# Patient Record
Sex: FEMALE
[De-identification: names, ages, dates, MRNs, and addresses within clinical notes are randomized per-mention and may not be internally consistent; named-entity substitution may affect disease eponyms.]

---

## 2019-06-14 ENCOUNTER — HOSPITAL ENCOUNTER (OUTPATIENT)
Dept: HOSPITAL 5 - TRG | Age: 24
Discharge: HOME | End: 2019-06-14
Attending: OBSTETRICS & GYNECOLOGY
Payer: MEDICAID

## 2019-06-14 VITALS — SYSTOLIC BLOOD PRESSURE: 107 MMHG | DIASTOLIC BLOOD PRESSURE: 73 MMHG

## 2019-06-14 DIAGNOSIS — O99.513: ICD-10-CM

## 2019-06-14 DIAGNOSIS — F17.200: ICD-10-CM

## 2019-06-14 DIAGNOSIS — O99.333: ICD-10-CM

## 2019-06-14 DIAGNOSIS — Z3A.29: ICD-10-CM

## 2019-06-14 DIAGNOSIS — O99.343: ICD-10-CM

## 2019-06-14 DIAGNOSIS — O24.913: ICD-10-CM

## 2019-06-14 DIAGNOSIS — F31.9: ICD-10-CM

## 2019-06-14 LAB
BACTERIA #/AREA URNS HPF: (no result) /HPF
BILIRUB UR QL STRIP: (no result)
BLOOD UR QL VISUAL: (no result)
PH UR STRIP: 8 [PH] (ref 5–7)
PROT UR STRIP-MCNC: (no result) MG/DL
RBC #/AREA URNS HPF: < 1 /HPF (ref 0–6)
UROBILINOGEN UR-MCNC: < 2 MG/DL (ref ?–2)
WBC #/AREA URNS HPF: < 1 /HPF (ref 0–6)

## 2019-06-14 PROCEDURE — 59025 FETAL NON-STRESS TEST: CPT

## 2019-06-14 PROCEDURE — 96360 HYDRATION IV INFUSION INIT: CPT

## 2019-06-14 PROCEDURE — 81001 URINALYSIS AUTO W/SCOPE: CPT

## 2019-07-04 ENCOUNTER — HOSPITAL ENCOUNTER (INPATIENT)
Dept: HOSPITAL 5 - TRG | Age: 24
LOS: 4 days | Discharge: HOME | DRG: 781 | End: 2019-07-08
Attending: OBSTETRICS & GYNECOLOGY | Admitting: OBSTETRICS & GYNECOLOGY
Payer: COMMERCIAL

## 2019-07-04 DIAGNOSIS — O24.013: ICD-10-CM

## 2019-07-04 DIAGNOSIS — Z88.2: ICD-10-CM

## 2019-07-04 DIAGNOSIS — Z91.018: ICD-10-CM

## 2019-07-04 DIAGNOSIS — Z90.10: ICD-10-CM

## 2019-07-04 DIAGNOSIS — Z95.1: ICD-10-CM

## 2019-07-04 DIAGNOSIS — Z87.890: ICD-10-CM

## 2019-07-04 DIAGNOSIS — O99.343: ICD-10-CM

## 2019-07-04 DIAGNOSIS — Z91.013: ICD-10-CM

## 2019-07-04 DIAGNOSIS — O99.513: ICD-10-CM

## 2019-07-04 DIAGNOSIS — O60.03: ICD-10-CM

## 2019-07-04 DIAGNOSIS — E10.9: ICD-10-CM

## 2019-07-04 DIAGNOSIS — Z90.49: ICD-10-CM

## 2019-07-04 DIAGNOSIS — F25.9: ICD-10-CM

## 2019-07-04 DIAGNOSIS — Z90.13: ICD-10-CM

## 2019-07-04 DIAGNOSIS — Z3A.32: ICD-10-CM

## 2019-07-04 DIAGNOSIS — J45.909: ICD-10-CM

## 2019-07-04 DIAGNOSIS — Z79.4: ICD-10-CM

## 2019-07-04 PROCEDURE — 86850 RBC ANTIBODY SCREEN: CPT

## 2019-07-04 PROCEDURE — G0378 HOSPITAL OBSERVATION PER HR: HCPCS

## 2019-07-04 PROCEDURE — 93306 TTE W/DOPPLER COMPLETE: CPT

## 2019-07-04 PROCEDURE — 82962 GLUCOSE BLOOD TEST: CPT

## 2019-07-04 PROCEDURE — 87086 URINE CULTURE/COLONY COUNT: CPT

## 2019-07-04 PROCEDURE — 76816 OB US FOLLOW-UP PER FETUS: CPT

## 2019-07-04 PROCEDURE — 76815 OB US LIMITED FETUS(S): CPT

## 2019-07-04 PROCEDURE — 94640 AIRWAY INHALATION TREATMENT: CPT

## 2019-07-04 PROCEDURE — 86870 RBC ANTIBODY IDENTIFICATION: CPT

## 2019-07-04 PROCEDURE — 76819 FETAL BIOPHYS PROFIL W/O NST: CPT

## 2019-07-04 PROCEDURE — 86901 BLOOD TYPING SEROLOGIC RH(D): CPT

## 2019-07-04 PROCEDURE — 99406 BEHAV CHNG SMOKING 3-10 MIN: CPT

## 2019-07-04 PROCEDURE — 87116 MYCOBACTERIA CULTURE: CPT

## 2019-07-04 PROCEDURE — 85025 COMPLETE CBC W/AUTO DIFF WBC: CPT

## 2019-07-04 PROCEDURE — 86900 BLOOD TYPING SEROLOGIC ABO: CPT

## 2019-07-04 PROCEDURE — 36415 COLL VENOUS BLD VENIPUNCTURE: CPT

## 2019-07-04 NOTE — HISTORY AND PHYSICAL REPORT
History of Present Illness


Date of examination: 19


Date of admission: 


2019


Chief complaint: 





my water broke


History of present illness: 





Pt c/o SROM of clear fluid at 2030pm, on 19. No contractions. Pt pregnancy

complicated by  labor for which he was treated with BMZ as well as DM for

which he in on insulin. Pt confirmed SROM by triage nurse.


EDC Confirmation:  2019


Gestational Age:  14 4/7 weeks





Past Pregnancy History 


   :      5


   Term Births:      0


   Premature Births:   1


   Living Children:   1


   Para:         1


   Mult. Births:      0


   Prev :   0


   Prev.  attempt?   0


   Aborta:      3


   Elect. Ab:      0


   Spont. Ab:      3


   Ectopics:      0





Pregnancy # 1


   Delivery date:     10/09/2016


   Weeks Gestation:   36


   Delivery type:     Vaginal


   Anesthesia type:     epidural


   Delivery location:     [facility]


   Infant Sex:      male


   Birth weight:      7.69


   Name:      Russel


   Comments:       labor





Pregnancy # 2


   Delivery date:     2018


   Weeks Gestation:   early


   Delivery type:     SAB








Past Medical History:


   Reviewed history from 2016 and no changes required:


      IDDM


      Asthma - albuterol


      Schitzoaffective dx - on lithium


      hx abnormal pap





Past Surgical History:


   appendectomy 2012


   right lobe of thyroid removed 


   elective masectomy 2018





Past Medical History 


Surgery (Non-gyn): appendectomy 2012


right lobe of thyroid removed 


elective masectomy 2018





Abnormal PAP: positive





Social Hx: Legally seperated


no ETOH/Drugs/smoking


FOC not involved 





Infection History 


Hx of STD: none


HIV Risk Eval: low risk


Hepatitis B Risk Eval: low risk


Rash, Viral, or Febrile illness since last LMP? no





Genetic History 


 Congenital Heart Defect:


    Mom: no  Dad: no


Canavan Disease:


    Mom: no  Dad: no


Thalassemia


    Mom: no  Dad: no


Neural Tube Defect


    Mom: no  Dad: no


Down's Syndrome


    Mom: no  Dad: no


Kiko-Sachs


    Mom: no  Dad: no


Sickle Cell Disease/Trait


    Mom: no  Dad: no


Hemophilia


    Mom: no  Dad: no


Muscular Dystrophy


    Mom: no  Dad: no


Cystic Fibrosis


    Mom: no  Dad: no


Brewster Chorea


    Mom: no  Dad: no


Mental Retardation


    Mom: no  Dad: no


Fragile X


    Mom: no  Dad: no


Other Genetic/Chromosomal Disorder


    Mom: no  Dad: no


Child w/other birth defect


    Mom: no  Dad: no





Enviromental Exposures 


Xray Exposure: no


Medication, drug, or alcohol use since LMP: yes


Chemical/Other Exposure: no


Exposure to Cat Liter: no


Hx of Parvovirus (Fifth Disease): no


Occupational Exposure to Children: none


Active Medications (reviewed today):


INSULIN () 


PNV () 


PROZAC 20 MG ORAL CAPSULE (FLUOXETINE HCL) 1 tab daily


IB TABS () 


FIORICET -40 MG ORAL CAPSULE (BUTALBITAL-APAP-CAFFEINE) 


ADDERALL () 


HALDOL () 





Current Allergies:


* SULFA (Critical)


* SEAFOOD (Critical)


* MUSHROOMS (Critical)





Past History


Past Medical History: other (see hpi)


Past Surgical History: other (see hpi)


GYN History: other (see hpi)


Family/Genetic History: other (see hpi)





- Obstetrical History


Expected Date of Delivery: 19


Actual Gestation: 32 Week(s) 0 Day(s) 


: 5


Para: 1


Hx # Term Pregnancies: 0


Number of  Pregnancies: 1


Number of Living Children: 1





Medications and Allergies


                                    Allergies











Allergy/AdvReac Type Severity Reaction Status Date / Time


 


acetaminophen [From Percocet] Allergy  Anaphylaxis Verified 10/08/16 22:52


 


mushroom Allergy  Anaphylaxis Verified 10/08/16 22:52


 


oxycodone HCl [From Percocet] Allergy  Anaphylaxis Verified 10/08/16 22:52


 


Sulfa (Sulfonamide Allergy  Anaphylaxis Verified 10/08/16 22:52





Antibiotics)     


 


seafood Allergy  Itching Uncoded 10/08/16 22:53











                                Home Medications











 Medication  Instructions  Recorded  Confirmed  Last Taken  Type


 


Ibuprofen [Motrin 800 MG tab] 800 mg PO TID PRN #30 tablet 10/09/16  Unknown Rx


 


Insulin Aspart [Novolog Flexpen] 0 unit SQ AC 10/09/16 10/09/16 Unknown History


 


Insulin Detemir [Levemir] 14 unit SQ BID 10/09/16 10/09/16 Unknown History


 


Lidocain2.5%/Prilocai2.5% [Emla] 5 gm TP PRN #1 tube 10/09/16  Unknown Rx


 


Prenatal Vit-Fe Fumar-FA [Prenatal 1 tab PO QDAY 10/09/16 10/09/16 10/08/16 

History





Vitamin]     


 


NIFEdipine [Nifedipine] 20 mg PO BID #60 capsule 19  Unknown Rx











Active Meds: 


Active Medications





Lactated Ringer's (Lactated Ringers)  500 mls @ 999 mls/hr IV BOLUS ONE


   Stop: 19 23:34











Review of Systems


All systems: negative





- Vital Signs


Vital signs: 


                                   Vital Signs











Temp


 


 98.4 F 


 


 19 23:09








                                        











Temp Pulse Resp BP Pulse Ox


 


 98.4 F             


 


 19 23:09            














Results


All other labs normal.








Assessment and Plan





- Patient Problems


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


Current Visit: No   Status: Acute   





(2) IDDM (insulin dependent diabetes mellitus)


Current Visit: No   Status: Acute   


Plan to address problem: 


-SSI while in house for now


-accucheck fasting and 2hr after meals








(3) Premature rupture of membranes


Current Visit: No   Status: Acute   


Plan to address problem: 


-expectant management for now


-s/p BMZ on 19 @ 28 5/7 weeks


-antibx for prolong latency


-deliver for s/sx of chorio











(4) Schizoaffective disorder


Current Visit: No   Status: Acute

## 2019-07-05 LAB
BASOPHILS # (AUTO): 0 K/MM3 (ref 0–0.1)
BASOPHILS NFR BLD AUTO: 0.1 % (ref 0–1.8)
EOSINOPHIL # BLD AUTO: 0.1 K/MM3 (ref 0–0.4)
EOSINOPHIL NFR BLD AUTO: 0.9 % (ref 0–4.3)
HCT VFR BLD CALC: 34.3 % (ref 30.3–42.9)
HGB BLD-MCNC: 11.6 GM/DL (ref 10.1–14.3)
LYMPHOCYTES # BLD AUTO: 1.5 K/MM3 (ref 1.2–5.4)
LYMPHOCYTES NFR BLD AUTO: 16.7 % (ref 13.4–35)
MCHC RBC AUTO-ENTMCNC: 34 % (ref 30–34)
MCV RBC AUTO: 85 FL (ref 79–97)
MONOCYTES # (AUTO): 0.5 K/MM3 (ref 0–0.8)
MONOCYTES % (AUTO): 5.5 % (ref 0–7.3)
PLATELET # BLD: 175 K/MM3 (ref 140–440)
RBC # BLD AUTO: 4.02 M/MM3 (ref 3.65–5.03)

## 2019-07-05 RX ADMIN — ACETAMINOPHEN SCH: 325 TABLET ORAL at 18:47

## 2019-07-05 RX ADMIN — ACETAMINOPHEN SCH MG: 325 TABLET ORAL at 19:36

## 2019-07-05 RX ADMIN — BETAMETHASONE SODIUM PHOSPHATE AND BETAMETHASONE ACETATE SCH MG: 3; 3 INJECTION, SUSPENSION INTRA-ARTICULAR; INTRALESIONAL; INTRAMUSCULAR at 10:22

## 2019-07-05 RX ADMIN — ERYTHROMYCIN LACTOBIONATE SCH MLS/HR: 500 INJECTION, POWDER, LYOPHILIZED, FOR SOLUTION INTRAVENOUS at 02:07

## 2019-07-05 RX ADMIN — AMPICILLIN SCH MLS/HR: 2 INJECTION, POWDER, FOR SOLUTION INTRAVENOUS at 21:38

## 2019-07-05 RX ADMIN — AMPICILLIN SCH MLS/HR: 2 INJECTION, POWDER, FOR SOLUTION INTRAVENOUS at 03:47

## 2019-07-05 RX ADMIN — AMPICILLIN SCH MLS/HR: 2 INJECTION, POWDER, FOR SOLUTION INTRAVENOUS at 10:24

## 2019-07-05 RX ADMIN — ERYTHROMYCIN LACTOBIONATE SCH MLS/HR: 500 INJECTION, POWDER, LYOPHILIZED, FOR SOLUTION INTRAVENOUS at 19:37

## 2019-07-05 RX ADMIN — SERTRALINE SCH MG: 50 TABLET, FILM COATED ORAL at 10:24

## 2019-07-05 RX ADMIN — ACETAMINOPHEN SCH MG: 325 TABLET ORAL at 23:30

## 2019-07-05 RX ADMIN — SODIUM CHLORIDE, SODIUM LACTATE, POTASSIUM CHLORIDE, AND CALCIUM CHLORIDE SCH MLS/HR: .6; .31; .03; .02 INJECTION, SOLUTION INTRAVENOUS at 02:07

## 2019-07-05 RX ADMIN — Medication SCH EACH: at 10:24

## 2019-07-05 RX ADMIN — ZOLPIDEM TARTRATE PRN MG: 10 TABLET, FILM COATED ORAL at 23:08

## 2019-07-05 RX ADMIN — ACETAMINOPHEN SCH MG: 325 TABLET ORAL at 10:24

## 2019-07-05 RX ADMIN — ERYTHROMYCIN LACTOBIONATE SCH MLS/HR: 500 INJECTION, POWDER, LYOPHILIZED, FOR SOLUTION INTRAVENOUS at 13:00

## 2019-07-05 RX ADMIN — AMPICILLIN SCH MLS/HR: 2 INJECTION, POWDER, FOR SOLUTION INTRAVENOUS at 16:01

## 2019-07-05 RX ADMIN — ERYTHROMYCIN LACTOBIONATE SCH MLS/HR: 500 INJECTION, POWDER, LYOPHILIZED, FOR SOLUTION INTRAVENOUS at 06:41

## 2019-07-05 NOTE — PROGRESS NOTE
<YING GUERRERO - Last Filed: 07/05/19 08:10>





Assessment and Plan


pt resting c/o worsening pain with ctx SVE 2,60,-2 Clear fluid noted with exam. 

Pain meds ordered.  on unit Decision to give BMZ and MGSO4








Subjective





- Subjective


Date of service: 07/05/19 (pt req pain meds for worsening ctx)


Principal diagnosis: PPPROM @ 32w2d; initial BMZ received 4 weeks ago


Patient reports: loss of fluid (clear fluids), fetal movement normal





Objective





- Vital Signs


Vital Signs: 


                               Vital Signs - 12hr











  07/04/19 07/05/19 07/05/19





  23:09 00:00 02:15


 


Temperature 98.4 F 98.2 F 98.1 F


 


Pulse Rate   


 


Respiratory  18 18





Rate   


 


Blood Pressure   














  07/05/19 07/05/19





  05:00 06:50


 


Temperature 97.5 F L 


 


Pulse Rate  99 H


 


Respiratory 16 





Rate  


 


Blood Pressure  107/65














- Exam


Breasts: deferred


Cardiovascular: Regular rate


Lungs: Normal air movement


Abdomen: Present: normal appearance, soft.  Absent: distention, tenderness


Uterus: Present: normal


FHR: auscultation normal, category 1


Uterine Contraction Monitor Mode: External


Cervical Dilatation: 2 (clear fluid)


Cervical Effacement Percentage: 60


Fetal station: -2


Uterine Contraction Pattern: Irregular


Uterine Tone Measurement Phase: Resting


Uterine Contraction Intensity: Mild


Extremities: normal


Deep Tendon Reflex Grade: Normal +2





- Labs


Labs: 


                                  Abnormal Labs











  07/04/19





  01:25


 


Seg Neutrophils %  76.8 H








                         Laboratory Results - last 24 hr











  07/04/19 07/04/19 07/05/19





  01:25 01:25 01:56


 


WBC  8.8  


 


RBC  4.02  


 


Hgb  11.6  


 


Hct  34.3  


 


MCV  85  


 


MCH  29  


 


MCHC  34  


 


RDW  14.1  


 


Plt Count  175  


 


Lymph % (Auto)  16.7  


 


Mono % (Auto)  5.5  


 


Eos % (Auto)  0.9  


 


Baso % (Auto)  0.1  


 


Lymph #  1.5  


 


Mono #  0.5  


 


Eos #  0.1  


 


Baso #  0.0  


 


Seg Neutrophils %  76.8 H  


 


Seg Neutrophils #  6.7  


 


POC Glucose    96


 


Blood Type   A NEGATIVE 














<MARITZA PADILLA - Last Filed: 07/05/19 09:36>





Assessment and Plan





- Patient Problems


(1) 32 week prematurity


Current Visit: Yes   Status: Acute   





(2) Premature rupture of membranes


Current Visit: No   Status: Acute   


Plan to address problem: 


Continues to complain of contraction q1-2minutes, not picked up on monitor, 

monitor adjusted. Abd soft NT, no s/s chorio at this time. Plan of care e

xplained, questions encouraged and answered, Patient voiced understanding and 

agrees with plan of care








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


Current Visit: No   Status: Acute   





(4) IDDM (insulin dependent diabetes mellitus)


Current Visit: No   Status: Acute   





(5) Schizoaffective disorder


Current Visit: No   Status: Acute   





Objective





- Vital Signs


Vital Signs: 


                               Vital Signs - 12hr











  07/04/19 07/05/19 07/05/19





  23:09 00:00 02:15


 


Temperature 98.4 F 98.2 F 98.1 F


 


Pulse Rate   


 


Respiratory  18 18





Rate   


 


Blood Pressure   














  07/05/19 07/05/19





  05:00 06:50


 


Temperature 97.5 F L 


 


Pulse Rate  99 H


 


Respiratory 16 





Rate  


 


Blood Pressure  107/65














- Labs


Labs: 


                                  Abnormal Labs











  07/04/19





  01:25


 


Seg Neutrophils %  76.8 H








                         Laboratory Results - last 24 hr











  07/04/19 07/04/19 07/05/19





  01:25 01:25 01:56


 


WBC  8.8  


 


RBC  4.02  


 


Hgb  11.6  


 


Hct  34.3  


 


MCV  85  


 


MCH  29  


 


MCHC  34  


 


RDW  14.1  


 


Plt Count  175  


 


Lymph % (Auto)  16.7  


 


Mono % (Auto)  5.5  


 


Eos % (Auto)  0.9  


 


Baso % (Auto)  0.1  


 


Lymph #  1.5  


 


Mono #  0.5  


 


Eos #  0.1  


 


Baso #  0.0  


 


Seg Neutrophils %  76.8 H  


 


Seg Neutrophils #  6.7  


 


POC Glucose    96


 


Blood Type   A NEGATIVE 


 


Antibody Screen   Positive 


 


Antibody Identification   Anti-D (Passively Aquired) 














  07/05/19





  08:32


 


WBC 


 


RBC 


 


Hgb 


 


Hct 


 


MCV 


 


MCH 


 


MCHC 


 


RDW 


 


Plt Count 


 


Lymph % (Auto) 


 


Mono % (Auto) 


 


Eos % (Auto) 


 


Baso % (Auto) 


 


Lymph # 


 


Mono # 


 


Eos # 


 


Baso # 


 


Seg Neutrophils % 


 


Seg Neutrophils # 


 


POC Glucose  86


 


Blood Type 


 


Antibody Screen 


 


Antibody Identification

## 2019-07-05 NOTE — ULTRASOUND REPORT
ULTRASOUND OBSTETRIC, limited



INDICATION / CLINICAL INFORMATION:

EFW.

Clinical Gestational Age (GA): Unknown



TECHNIQUE:

Transabdominal.



COMPARISON:

None available.



FINDINGS:

There is a single intrauterine pregnancy. 



Biparietal Diameter = 8.8 cm = 35 weeks, 3 day(s).

Head Circumference = 30.7 cm = 34 weeks, 2 day(s).

Abdominal Circumference = 30.2 cm = 34 weeks, 1 day(s).

Femur Length = 6.4 cm = 33 weeks, 1 day(s).

Average Ultrasound Age (AUA) = 34 weeks, 2 day(s).



Fetal Heart Rate: 137  beats per minute. 

Estimated Fetal Birth Weight in grams (if calculated): 2327 g, 5 lbs. 2 oz.

Estimated Fetal Weight Growth Percentile (if calculated): Not calculated



Fetal Position: cephalic. 

Cervix: closed.  Length in cm (if measured): Not measured

Placenta: Fundal and free of the os.

Amniotic Fluid Volume: Normal 

Amniotic Fluid Index (KULDIP) in cm (if calculated): 12.4 cm.

Maternal Adnexa: No significant abnormality.



Anatomical survey not performed



IMPRESSION:

1. Single, living intrauterine pregnancy with estimated sonographic age of 34 weeks, 2  day(s).

2. No significant sonographic abnormality.



Signer Name: Alexa Padilla MD 

Signed: 7/5/2019 1:40 AM

 Workstation Name: [id number]

## 2019-07-05 NOTE — CONSULTATION
Prenatal Consult Note





- Parent Education


I met with parent(s) and discussed the following:: Need for NICU admission, Poss

ible need for intubation and surfactant or other resp support, Temperature 

regulation, Possible need for IV fluids/TPN and IV antibiotics, Possible need 

for umbilical lines, Importance of providing breast milk & encouraged pumping 

aft delivery, Donor breast milk if baby meets criteria after birth, Slow feeding

advancement and monitoring of tolerance. NG/OG feeds, Need to monitor for 

jaundice, Data for survival & survival without significant co-morbidities


Parent(s) demonstrated understanding of all the information:: Yes





Assessment and Plan





- Assessment


Gestation:: 32 (32 )


Estimated Fetal Weight: 2327 gms


Baby's gender: Male


Baby's name: Freddie 


Additional Comment: 22YO  mother with IDM on insulin, scizoaffective 

disorder on lithium, female-male transgender. Hx of previous 36 weeker.  

labor. Received BMZ x2. Mother with hx of double mastectomy. Agreed to formula.





- Plan


Plan: 





Will attend delivery


Please call NICU with questions

## 2019-07-06 RX ADMIN — ERYTHROMYCIN LACTOBIONATE SCH MLS/HR: 500 INJECTION, POWDER, LYOPHILIZED, FOR SOLUTION INTRAVENOUS at 06:03

## 2019-07-06 RX ADMIN — ERYTHROMYCIN LACTOBIONATE SCH MLS/HR: 500 INJECTION, POWDER, LYOPHILIZED, FOR SOLUTION INTRAVENOUS at 00:09

## 2019-07-06 RX ADMIN — ERYTHROMYCIN LACTOBIONATE SCH MLS/HR: 500 INJECTION, POWDER, LYOPHILIZED, FOR SOLUTION INTRAVENOUS at 12:49

## 2019-07-06 RX ADMIN — AMPICILLIN SCH MLS/HR: 2 INJECTION, POWDER, FOR SOLUTION INTRAVENOUS at 04:06

## 2019-07-06 RX ADMIN — ERYTHROMYCIN LACTOBIONATE SCH MLS/HR: 500 INJECTION, POWDER, LYOPHILIZED, FOR SOLUTION INTRAVENOUS at 19:14

## 2019-07-06 RX ADMIN — AMPICILLIN SCH MLS/HR: 2 INJECTION, POWDER, FOR SOLUTION INTRAVENOUS at 10:24

## 2019-07-06 RX ADMIN — AMPICILLIN SCH MLS/HR: 2 INJECTION, POWDER, FOR SOLUTION INTRAVENOUS at 16:32

## 2019-07-06 RX ADMIN — SODIUM CHLORIDE, SODIUM LACTATE, POTASSIUM CHLORIDE, AND CALCIUM CHLORIDE SCH MLS/HR: .6; .31; .03; .02 INJECTION, SOLUTION INTRAVENOUS at 21:44

## 2019-07-06 RX ADMIN — Medication SCH EACH: at 10:25

## 2019-07-06 RX ADMIN — SERTRALINE SCH MG: 50 TABLET, FILM COATED ORAL at 10:24

## 2019-07-06 RX ADMIN — BETAMETHASONE SODIUM PHOSPHATE AND BETAMETHASONE ACETATE SCH MG: 3; 3 INJECTION, SUSPENSION INTRA-ARTICULAR; INTRALESIONAL; INTRAMUSCULAR at 10:23

## 2019-07-06 RX ADMIN — SODIUM CHLORIDE, SODIUM LACTATE, POTASSIUM CHLORIDE, AND CALCIUM CHLORIDE SCH MLS/HR: .6; .31; .03; .02 INJECTION, SOLUTION INTRAVENOUS at 10:30

## 2019-07-06 NOTE — PROGRESS NOTE
Assessment and Plan





- Patient Problems


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


Current Visit: No   Status: Acute   





(2) IDDM (insulin dependent diabetes mellitus)


Current Visit: No   Status: Acute   


Plan to address problem: 


BS have been stable. Con't SSI








(3) Premature rupture of membranes


Current Visit: No   Status: Acute   


Plan to address problem: 


no s/sx of chorio/ no s/sx of  labor


s/p MgSO4 4g bolus for neruoprotection


deliver for s/sx of chorio


con't IV antibx


GBS cx pending.








(4) Schizoaffective disorder


Current Visit: No   Status: Acute   


Plan to address problem: 


-only taking zoloft at this time


-currently no issues








Subjective





- Subjective


Date of service: 19


Principal diagnosis: PPPROM @ 32w2d; initial BMZ received 4 weeks ago


Interval history: 


No c/o at this time. No contractions. Does states still have leaking of fluid 

that appears clear. Inquried about showering. I advised that he can have 

pericare but we want to avoid anything going in or near the vagina due to having

SROM and in an effort to decrease risk of infection. Mother had questions 

regarding monitoring and vaginal exams for labor. Several minutes spent speaking

to pt and his mother regarding this. State he overall has no issues this am.


Patient reports: loss of fluid (clear fluids), fetal movement normal





Objective





- Vital Signs


Vital Signs: 


                               Vital Signs - 12hr











  19





  00:00 02:00 04:00


 


Temperature 97.8 F 98 F 98.4 F


 


Pulse Rate 86  90


 


Pulse Rate [   





Anterior   





Bilateral   





Throughout]   


 


Respiratory 18  18





Rate   


 


Respiratory   





Rate [Anterior   





Bilateral   





Throughout]   


 


Blood Pressure   


 


Blood Pressure 110/70  107/80





[Right]   


 


O2 Sat by Pulse 100  100





Oximetry   














  19





  05:07 06:00 10:05


 


Temperature  97.9 F 


 


Pulse Rate   105 H


 


Pulse Rate [ 95 H  





Anterior   





Bilateral   





Throughout]   


 


Respiratory   





Rate   


 


Respiratory 16  





Rate [Anterior   





Bilateral   





Throughout]   


 


Blood Pressure   98/53


 


Blood Pressure   





[Right]   


 


O2 Sat by Pulse   





Oximetry   














- Exam


Cardiovascular: Normal S1, Normal S2


Lungs: Clear to auscultation


Abdomen: Present: normal appearance, soft





- Labs


Labs: 


                                  Abnormal Labs











  07/04/19 07/05/19 07/06/19





  01:25 21:56 06:08


 


Seg Neutrophils %  76.8 H  


 


POC Glucose   114 H  123 H








                         Laboratory Results - last 24 hr











  19





  12:02 21:56 06:08


 


POC Glucose  101  114 H  123 H

## 2019-07-06 NOTE — EVENT NOTE
Date: 07/06/19


Called about elevated BG levels. Will change sliding scale from low range to 

moderate range. RN informed of change in scale.

## 2019-07-07 RX ADMIN — ZOLPIDEM TARTRATE PRN MG: 10 TABLET, FILM COATED ORAL at 22:34

## 2019-07-07 RX ADMIN — SIMETHICONE PRN MG: 80 TABLET, CHEWABLE ORAL at 23:27

## 2019-07-07 RX ADMIN — FAMOTIDINE SCH MG: 10 INJECTION, SOLUTION INTRAVENOUS at 10:24

## 2019-07-07 RX ADMIN — AMPICILLIN SCH MLS/HR: 2 INJECTION, POWDER, FOR SOLUTION INTRAVENOUS at 04:48

## 2019-07-07 RX ADMIN — FAMOTIDINE SCH: 10 INJECTION, SOLUTION INTRAVENOUS at 10:21

## 2019-07-07 RX ADMIN — SODIUM CHLORIDE, SODIUM LACTATE, POTASSIUM CHLORIDE, AND CALCIUM CHLORIDE SCH MLS/HR: .6; .31; .03; .02 INJECTION, SOLUTION INTRAVENOUS at 10:21

## 2019-07-07 RX ADMIN — Medication SCH EACH: at 10:21

## 2019-07-07 RX ADMIN — SIMETHICONE PRN MG: 80 TABLET, CHEWABLE ORAL at 02:28

## 2019-07-07 RX ADMIN — SERTRALINE SCH MG: 50 TABLET, FILM COATED ORAL at 13:55

## 2019-07-07 NOTE — PROGRESS NOTE
Assessment and Plan





- Patient Problems


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


Current Visit: No   Status: Acute   





(2) IDDM (insulin dependent diabetes mellitus)


Current Visit: No   Status: Acute   





(3) Premature rupture of membranes


Current Visit: No   Status: Acute   


Plan to address problem: 


no s/sx of chorio/ no s/sx of  labor


s/p MgSO4 4g bolus for neruoprotection


deliver for s/sx of chorio


con't IV antibx


GBS cx pending.


will allow shower this am as pt has been stable.








(4) Schizoaffective disorder


Current Visit: No   Status: Acute   





Subjective





- Subjective


Date of service: 19


Principal diagnosis: PPPROM @ 32w3 initial BMZ received 4 weeks ago second round

this admission


Interval history: 


Pt c/o having contractions but what is tracing does not all appear to be 

contractions vs valsava. I advised not to do this and that we don't have a 

reason to proceed with delivery at this time. I also expressed prematurity and 

the benefits of keeping the fetus intautero as long as possible. I did advise 

that as he has been stable w/o cervical change and no s/sx of infection I would 

allow him to sit in the chair to get out of the bed. I futher stressed that meds

to stop contractions are not recommended at this time as she has been given 

steroids. He expressed understanding. All questions were addressed and answered.

This was the same discussion that was had yesterday as well as on admission. 

Again stressed there has to be a clear indication for augmentation of labor and 

if that reason presents it self, we will proceed with labor. Will attempt fluid 

bolus at this time.


Patient reports: loss of fluid (clear fluids), fetal movement normal





Objective





- Vital Signs


Vital Signs: 


                               Vital Signs - 12hr











  19





  23:24 00:00 00:15


 


Temperature  96.0 F L 


 


Pulse Rate   


 


Respiratory 18 18 18





Rate   


 


Blood Pressure   














  19





  00:36 01:15 05:00


 


Temperature   97.9 F


 


Pulse Rate 75  


 


Respiratory  18 20





Rate   


 


Blood Pressure 106/60  














  19





  05:32


 


Temperature 


 


Pulse Rate 80


 


Respiratory 





Rate 


 


Blood Pressure 98/56














- Exam


Cardiovascular: Normal S1, Normal S2


Lungs: Clear to auscultation, Normal air movement


Abdomen: Present: normal appearance, soft, normal bowel sounds, other (no 

tenderness)


FHR: category 1


Uterine Contraction Pattern: Irregular


Uterine Tone Measurement Phase: Resting


Extremities: tenderness





- Labs


Labs: 


                                  Abnormal Labs











  19





  01:25 21:56 06:08


 


Seg Neutrophils %  76.8 H  


 


POC Glucose   114 H  123 H














  19





  13:33 19:38 21:55


 


Seg Neutrophils %   


 


POC Glucose  190 H  172 H  181 H














  19





  06:28


 


Seg Neutrophils % 


 


POC Glucose  107 H








                         Laboratory Results - last 24 hr











  19





  13:33 19:38 21:55


 


POC Glucose  190 H  172 H  181 H














  19





  06:28


 


POC Glucose  107 H

## 2019-07-07 NOTE — EVENT NOTE
Date: 07/07/19





Pt requesting to speak [facility] provider. c/o having buring with urination and 

feeling as if heart rate was high. Pt advised that the heart rate and the O2 sat

are normal. Accuchek done and was normal. Pt states she did not eat as much of 

her dinner. States she does not feel that she is having a panic attack. Again pt

reassurred and advied that urine would be collected for urine culture. I d/w 

obtaining and echo for the symptoms she is having is they persist. she expressed

understanding. Several questions were addressed and answered with provider at 

bedside.

## 2019-07-08 VITALS — SYSTOLIC BLOOD PRESSURE: 110 MMHG | DIASTOLIC BLOOD PRESSURE: 60 MMHG

## 2019-07-08 RX ADMIN — SERTRALINE SCH MG: 50 TABLET, FILM COATED ORAL at 09:57

## 2019-07-08 RX ADMIN — Medication SCH EACH: at 09:57

## 2019-07-08 RX ADMIN — SODIUM CHLORIDE, SODIUM LACTATE, POTASSIUM CHLORIDE, AND CALCIUM CHLORIDE SCH MLS/HR: .6; .31; .03; .02 INJECTION, SOLUTION INTRAVENOUS at 16:32

## 2019-07-08 RX ADMIN — FAMOTIDINE SCH MG: 10 INJECTION, SOLUTION INTRAVENOUS at 09:58

## 2019-07-08 RX ADMIN — SIMETHICONE PRN MG: 80 TABLET, CHEWABLE ORAL at 07:52

## 2019-07-08 RX ADMIN — SODIUM CHLORIDE, SODIUM LACTATE, POTASSIUM CHLORIDE, AND CALCIUM CHLORIDE SCH MLS/HR: .6; .31; .03; .02 INJECTION, SOLUTION INTRAVENOUS at 03:19

## 2019-07-08 NOTE — ULTRASOUND REPORT
Limited OB ultrasound with biophysical profile.



HISTORY: Evaluate amniotic fluid index.



FINDINGS: A limited OB ultrasound was performed. A single viable intrauterine pregnancy is in the cep
halic position. Fetal heart tones are 162 bpm. Amniotic fluid index is 17.7 cm. Largest pocket is guille
drant 2 (5.3 cm).



Biophysical profile is 8/8. 2 points were received for fetal breathing, fetal movement, fetal posture
/tone and amniotic fluid volume.



IMPRESSION:

1. Normal KLUDIP.

2. Biophysical profile 8/8.



Signer Name: Clifford Snell MD 

Signed: 7/8/2019 7:58 PM

 Workstation Name: [id number]

## 2019-07-08 NOTE — PROGRESS NOTE
Assessment and Plan


Called by RN to assess patient per patient request. Pt reports he "just feels 

bad" "like something is wrong". Pt reports concerns for FHTs being "too high" 

and requesting induction be initiated at this time. FHTs currently baseline 155,

category 1 tracing. Patient temperature is 98.0 temporal, skin is cool and dry 

to touch. Assessment is WNL. RN to obtain pulse ox for continuous monitoring. Pt

reports "it burns when I pee". Order for urine culture last night reads 

"uncollected" but patient reports he is sure the nurse collected it and sent 

specimen to lab. F/u with RN to check results for urine, as well as obtain 

report for Echo that was done last night. Patient denies any difficulty 

breathing, SOB, elevated HR, or any other specific complaints. He denies any 

regular or painful contractions. Irregular contractions noted on TOCO. Abdomen 

palpated soft, non-tender. He reports continued leaking of clear amniotic fluid 

since admission. VSSAF. DWP that as of now, there is no clear indication for 

induction at this time, as FHT tracing is normal, he is afebrile, no s/s of 

infection/chorio, no s/s labor, no apparent signs of maternal or fetal distress.

Will continue to monitor and consult with Dr. Lam for any updates to POC.










Subjective





- Subjective


Date of service: 07/08/19


Principal diagnosis: PPPROM @ 32w3 initial BMZ received 4 weeks ago second round

this admission


Patient reports: new complaints ("feel sick, just bad all over, like something 

is wrong"), loss of fluid (continued since admission, clear fluids), fetal 

movement normal, no vaginal bleeding, no contractions





Objective





- Vital Signs


Vital Signs: 


                               Vital Signs - 12hr











  07/08/19 07/08/19 07/08/19





  06:46 08:04 09:59


 


Temperature 98.2 F  


 


Pulse Rate  80 81


 


Respiratory   





Rate   


 


Blood Pressure  87/50 102/58














  07/08/19 07/08/19 07/08/19





  11:00 11:59 12:00


 


Temperature   98 F


 


Pulse Rate 86 79 


 


Respiratory   16





Rate   


 


Blood Pressure 96/52 103/63 














  07/08/19 07/08/19





  16:00 16:02


 


Temperature 98.2 F 


 


Pulse Rate  83


 


Respiratory 18 





Rate  


 


Blood Pressure  116/62














- Exam


Cardiovascular: Regular rate, Normal S1, Normal S2


Lungs: Clear to auscultation, Normal air movement


Abdomen: Present: normal appearance, soft, normal bowel sounds.  Absent: 

distention, tenderness


Uterus: Present: normal


FHR: auscultation normal


Uterine Contraction Monitor Mode: External


Uterine Contraction Pattern: Irregular


Uterine Contraction Intensity: Mild





- Labs


Labs: 


                                  Abnormal Labs











  07/04/19 07/05/19 07/06/19





  01:25 21:56 06:08


 


Seg Neutrophils %  76.8 H  


 


POC Glucose   114 H  123 H














  07/06/19 07/06/19 07/06/19





  13:33 19:38 21:55


 


Seg Neutrophils %   


 


POC Glucose  190 H  172 H  181 H














  07/07/19 07/07/19 07/07/19





  06:28 12:52 17:03


 


Seg Neutrophils %   


 


POC Glucose  107 H  65 L  150 H














  07/08/19





  14:16


 


Seg Neutrophils % 


 


POC Glucose  114 H








                         Laboratory Results - last 24 hr











  07/07/19 07/08/19 07/08/19





  19:57 06:41 12:12


 


POC Glucose  81  80  73














  07/08/19





  14:16


 


POC Glucose  114 H

## 2019-07-08 NOTE — EVENT NOTE
Date: 07/08/19


Consult with Dr. Lam r/t patient's c/o and assessment, orders for repeat 

US for BPP and KULDIP. Results reviewed- BPP 8/8, KULDIP 17, increased from 12 on 

admission. Sterile speculum performed, no pooling noted, no fluid noted coming 

from cervical os. Nitrazine is negatilve. Fern negative. SVE reveals IBOW, no 

cervical change from prior examination. DWP examination results, patient states 

he is confused and upset. Extensive discussion r/t other possible reasons for 

"leaking fluid". Patient reassured and verbalizes understanding. Discussed plans

to discharge home and f/u in office on Thursday morning.

## 2019-07-08 NOTE — ULTRASOUND REPORT
Limited OB ultrasound with biophysical profile.



HISTORY: Evaluate amniotic fluid index.



FINDINGS: A limited OB ultrasound was performed. A single viable intrauterine pregnancy is in the cep
halic position. Fetal heart tones are 162 bpm. Amniotic fluid index is 17.7 cm. Largest pocket is guille
drant 2 (5.3 cm).



Biophysical profile is 8/8. 2 points were received for fetal breathing, fetal movement, fetal posture
/tone and amniotic fluid volume.



IMPRESSION:

1. Normal KULDIP.

2. Biophysical profile 8/8.



Signer Name: Clifford Snell MD 

Signed: 7/8/2019 7:58 PM

 Workstation Name: [id number]

## 2019-07-08 NOTE — DISCHARGE SUMMARY
Providers





- Providers


Date of Admission: 


07/08/19 09:18





Date of discharge: 07/08/19


Attending physician: 


POLY OLIVAS





                                        





07/04/19 23:48


Consult to Physician [CONS] Routine 


   Comment: 


   Consulting Provider: FREDDIE MARTINES


   Physician Instructions: 


   Reason For Exam: 32 wks, IDDM, PROM











Primary care physician: 


POLY OLIVAS








Hospitalization


Reason for admission: SROM


Condition: Stable


Pertinent studies: 





KULDIP on admission 12, repeat KULDIP 17. Nitrazine, pooling, and fern all negative. 

BPP 8/8


Hospital course: 





completion of BMZ series and magnesium sulfate course


Disposition: DC-01 TO HOME OR SELFCARE





Core Measure Documentation





- Palliative Care


Palliative Care/ Comfort Measures: Not Applicable





- Core Measures


Any of the following diagnoses?: none





Exam





- Constitutional


Vitals: 


                                        











Temp Pulse Resp BP Pulse Ox


 


 98.2 F   73   18   110/60   94 


 


 07/08/19 16:00  07/08/19 20:41  07/08/19 16:00  07/08/19 19:53  07/08/19 20:41











General appearance: Present: no acute distress, well-nourished





- EENT


Eyes: Present: PERRL


ENT: hearing intact, clear oral mucosa





- Neck


Neck: Present: supple, normal ROM





- Respiratory


Respiratory effort: normal


Respiratory: bilateral: CTA





- Cardiovascular


Heart Sounds: Present: S1 & S2.  Absent: rub, click





- Extremities


Extremities: pulses symmetrical, No edema


Peripheral Pulses: within normal limits





- Abdominal


General gastrointestinal: Present: soft, non-tender, non-distended, normal bowel

 sounds


Female genitourinary: Present: normal





- Integumentary


Integumentary: Present: clear, warm, dry





- Musculoskeletal


Musculoskeletal: gait normal, strength equal bilaterally





- Psychiatric


Psychiatric: appropriate mood/affect, intact judgment & insight





- Neurologic


Neurologic: CNII-XII intact, moves all extremities





Plan


Activity: no restrictions


Diet: regular


Additional Instructions: rx for pyridium electronically sent to pharmacy


Follow up with: 


SARAHI LOREDO CNM [Advanced Practice Nurse] - 07/11/19 (Follow up OB 

appointment scheduled with KANU Helm on Thursday July 11 at 11:15.)


Forms:  [facility] Discharge Summary

## 2019-07-08 NOTE — PROGRESS NOTE
Assessment and Plan





Patient resting in bed with eyes closed, arouses easily to voice and touch. 

Patient reports feeling well, denies any complaints at this time. Pt denies any 

contractions, abdomen palpates soft, non tender. VSSAF. Patient denies any s/s 

infection. Will continue to monitor for s/s of labor and/or chorio. 





- Patient Problems


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


Current Visit: No   Status: Acute   





(2) IDDM (insulin dependent diabetes mellitus)


Current Visit: No   Status: Acute   





(3) Premature rupture of membranes


Current Visit: No   Status: Acute   


Plan to address problem: 


no s/sx of chorio  no s/sx of  labor


s/p MgSO4 4g bolus for neruoprotection


deliver for s/sx of chorio





(4) Schizoaffective disorder


Current Visit: No   Status: Acute   











Subjective





- Subjective


Date of service: 19


Principal diagnosis: PPPROM @ 32w3 initial BMZ received 4 weeks ago second round

this admission


Patient reports: loss of fluid (continued since admission, clear fluids), fetal 

movement normal, no new complaints, no vaginal bleeding, no contractions





Objective





- Vital Signs


Vital Signs: 


                               Vital Signs - 12hr











  19





  21:21 23:31 06:46


 


Temperature 97.8 F  98.2 F


 


Pulse Rate  83 


 


Respiratory 18  





Rate   


 


Blood Pressure  102/56 














- Exam


Cardiovascular: Regular rate, Normal S1, Normal S2


Lungs: Clear to auscultation, Normal air movement


Abdomen: Present: normal appearance, soft, normal bowel sounds.  Absent: 

distention, tenderness


Uterus: Present: normal


FHR: auscultation normal


Uterine Contraction Monitor Mode: External


Uterine Contraction Pattern: Absent


Extremities: normal





- Labs


Labs: 


                                  Abnormal Labs











  19





  01:25 21:56 06:08


 


Seg Neutrophils %  76.8 H  


 


POC Glucose   114 H  123 H














  19





  13:33 19:38 21:55


 


Seg Neutrophils %   


 


POC Glucose  190 H  172 H  181 H














  19





  06:28 12:52 17:03


 


Seg Neutrophils %   


 


POC Glucose  107 H  65 L  150 H








                         Laboratory Results - last 24 hr











  19





  12:52 17:03 19:57


 


POC Glucose  65 L  150 H  81














  19





  06:41


 


POC Glucose  80

## 2019-07-19 ENCOUNTER — HOSPITAL ENCOUNTER (OUTPATIENT)
Dept: HOSPITAL 5 - TRG | Age: 24
Discharge: HOME | End: 2019-07-19
Attending: OBSTETRICS & GYNECOLOGY
Payer: MEDICAID

## 2019-07-19 VITALS — SYSTOLIC BLOOD PRESSURE: 93 MMHG | DIASTOLIC BLOOD PRESSURE: 50 MMHG

## 2019-07-19 DIAGNOSIS — O99.283: ICD-10-CM

## 2019-07-19 DIAGNOSIS — F31.9: ICD-10-CM

## 2019-07-19 DIAGNOSIS — O99.513: ICD-10-CM

## 2019-07-19 DIAGNOSIS — E03.9: ICD-10-CM

## 2019-07-19 DIAGNOSIS — O09.213: ICD-10-CM

## 2019-07-19 DIAGNOSIS — O24.813: ICD-10-CM

## 2019-07-19 DIAGNOSIS — Z90.10: ICD-10-CM

## 2019-07-19 DIAGNOSIS — J45.909: ICD-10-CM

## 2019-07-19 DIAGNOSIS — Z3A.34: ICD-10-CM

## 2019-07-19 DIAGNOSIS — O99.343: ICD-10-CM

## 2019-07-19 LAB
BILIRUB UR QL STRIP: (no result)
BLOOD UR QL VISUAL: (no result)
PH UR STRIP: 7 [PH] (ref 5–7)
PROT UR STRIP-MCNC: (no result) MG/DL
RBC #/AREA URNS HPF: 5 /HPF (ref 0–6)
UROBILINOGEN UR-MCNC: < 2 MG/DL (ref ?–2)
WBC #/AREA URNS HPF: 4 /HPF (ref 0–6)

## 2019-07-19 PROCEDURE — 96360 HYDRATION IV INFUSION INIT: CPT

## 2019-07-19 PROCEDURE — 96372 THER/PROPH/DIAG INJ SC/IM: CPT

## 2019-07-19 PROCEDURE — 59025 FETAL NON-STRESS TEST: CPT

## 2019-07-19 PROCEDURE — 96361 HYDRATE IV INFUSION ADD-ON: CPT

## 2019-07-19 PROCEDURE — 81001 URINALYSIS AUTO W/SCOPE: CPT

## 2019-07-23 ENCOUNTER — HOSPITAL ENCOUNTER (OUTPATIENT)
Dept: HOSPITAL 5 - TRG | Age: 24
Discharge: HOME | End: 2019-07-23
Attending: OBSTETRICS & GYNECOLOGY
Payer: MEDICAID

## 2019-07-23 VITALS — SYSTOLIC BLOOD PRESSURE: 95 MMHG | DIASTOLIC BLOOD PRESSURE: 55 MMHG

## 2019-07-23 DIAGNOSIS — O47.03: Primary | ICD-10-CM

## 2019-07-23 DIAGNOSIS — Z3A.34: ICD-10-CM

## 2019-07-23 DIAGNOSIS — O99.513: ICD-10-CM

## 2019-07-23 DIAGNOSIS — J45.909: ICD-10-CM

## 2019-07-23 LAB
BACTERIA #/AREA URNS HPF: (no result) /HPF
BILIRUB UR QL STRIP: (no result)
BLOOD UR QL VISUAL: (no result)
MUCOUS THREADS #/AREA URNS HPF: (no result) /HPF
PH UR STRIP: 7 [PH] (ref 5–7)
PROT UR STRIP-MCNC: (no result) MG/DL
RBC #/AREA URNS HPF: 15 /HPF (ref 0–6)
UROBILINOGEN UR-MCNC: < 2 MG/DL (ref ?–2)
WBC #/AREA URNS HPF: 28 /HPF (ref 0–6)

## 2019-07-23 PROCEDURE — 81001 URINALYSIS AUTO W/SCOPE: CPT

## 2019-07-23 PROCEDURE — 87086 URINE CULTURE/COLONY COUNT: CPT

## 2019-07-23 PROCEDURE — 59025 FETAL NON-STRESS TEST: CPT

## 2019-07-23 PROCEDURE — 82962 GLUCOSE BLOOD TEST: CPT

## 2019-08-16 ENCOUNTER — HOSPITAL ENCOUNTER (INPATIENT)
Dept: HOSPITAL 5 - LD | Age: 24
LOS: 1 days | Discharge: HOME | End: 2019-08-17
Attending: OBSTETRICS & GYNECOLOGY | Admitting: OBSTETRICS & GYNECOLOGY
Payer: MEDICAID

## 2019-08-16 DIAGNOSIS — Z91.013: ICD-10-CM

## 2019-08-16 DIAGNOSIS — J45.909: ICD-10-CM

## 2019-08-16 DIAGNOSIS — E11.9: ICD-10-CM

## 2019-08-16 DIAGNOSIS — Z88.1: ICD-10-CM

## 2019-08-16 DIAGNOSIS — F64.0: ICD-10-CM

## 2019-08-16 DIAGNOSIS — F31.9: ICD-10-CM

## 2019-08-16 DIAGNOSIS — O26.893: ICD-10-CM

## 2019-08-16 DIAGNOSIS — Z3A.38: ICD-10-CM

## 2019-08-16 DIAGNOSIS — Z67.11: ICD-10-CM

## 2019-08-16 DIAGNOSIS — Z91.018: ICD-10-CM

## 2019-08-16 DIAGNOSIS — F25.9: ICD-10-CM

## 2019-08-16 DIAGNOSIS — E03.9: ICD-10-CM

## 2019-08-16 DIAGNOSIS — Z88.2: ICD-10-CM

## 2019-08-16 LAB
HCT VFR BLD CALC: 34.8 % (ref 30.3–42.9)
HCT VFR BLD CALC: 35.5 % (ref 30.3–42.9)
HGB BLD-MCNC: 11.5 GM/DL (ref 10.1–14.3)
HGB BLD-MCNC: 12 GM/DL (ref 10.1–14.3)
MCHC RBC AUTO-ENTMCNC: 34 % (ref 30–34)
MCV RBC AUTO: 85 FL (ref 79–97)
PLATELET # BLD: 152 K/MM3 (ref 140–440)
RBC # BLD AUTO: 4.16 M/MM3 (ref 3.65–5.03)

## 2019-08-16 PROCEDURE — 85027 COMPLETE CBC AUTOMATED: CPT

## 2019-08-16 PROCEDURE — 85014 HEMATOCRIT: CPT

## 2019-08-16 PROCEDURE — 3E0R3BZ INTRODUCTION OF ANESTHETIC AGENT INTO SPINAL CANAL, PERCUTANEOUS APPROACH: ICD-10-PCS | Performed by: OBSTETRICS & GYNECOLOGY

## 2019-08-16 PROCEDURE — 3E033VJ INTRODUCTION OF OTHER HORMONE INTO PERIPHERAL VEIN, PERCUTANEOUS APPROACH: ICD-10-PCS | Performed by: OBSTETRICS & GYNECOLOGY

## 2019-08-16 PROCEDURE — 86762 RUBELLA ANTIBODY: CPT

## 2019-08-16 PROCEDURE — 82962 GLUCOSE BLOOD TEST: CPT

## 2019-08-16 PROCEDURE — 86850 RBC ANTIBODY SCREEN: CPT

## 2019-08-16 PROCEDURE — 85461 HEMOGLOBIN FETAL: CPT

## 2019-08-16 PROCEDURE — 85018 HEMOGLOBIN: CPT

## 2019-08-16 PROCEDURE — 86901 BLOOD TYPING SEROLOGIC RH(D): CPT

## 2019-08-16 PROCEDURE — 3E0234Z INTRODUCTION OF SERUM, TOXOID AND VACCINE INTO MUSCLE, PERCUTANEOUS APPROACH: ICD-10-PCS | Performed by: OBSTETRICS & GYNECOLOGY

## 2019-08-16 PROCEDURE — 88307 TISSUE EXAM BY PATHOLOGIST: CPT

## 2019-08-16 PROCEDURE — 00HU33Z INSERTION OF INFUSION DEVICE INTO SPINAL CANAL, PERCUTANEOUS APPROACH: ICD-10-PCS | Performed by: OBSTETRICS & GYNECOLOGY

## 2019-08-16 PROCEDURE — 86592 SYPHILIS TEST NON-TREP QUAL: CPT

## 2019-08-16 PROCEDURE — 36415 COLL VENOUS BLD VENIPUNCTURE: CPT

## 2019-08-16 PROCEDURE — 86900 BLOOD TYPING SEROLOGIC ABO: CPT

## 2019-08-16 RX ADMIN — DOCUSATE SODIUM SCH MG: 100 CAPSULE, LIQUID FILLED ORAL at 21:52

## 2019-08-16 RX ADMIN — IBUPROFEN SCH MG: 600 TABLET, FILM COATED ORAL at 21:52

## 2019-08-16 RX ADMIN — METHYLERGONOVINE MALEATE SCH MG: 0.2 TABLET ORAL at 21:52

## 2019-08-16 NOTE — HISTORY AND PHYSICAL REPORT
History of Present Illness


Date of examination: 19


Date of admission: 


19 08:10





Chief complaint: 


 IOl @ 38+1 weeks for IDDM





History of present illness: 


EDC Confirmation:  2019








Past Pregnancy History 


   :      5


   Term Births:      0


   Premature Births:   1


   Living Children:   1


   Para:      1


   Mult. Births:      0


   Prev :   0


   Prev.  attempt?   0


   Aborta:      3


   Elect. Ab:      0


   Spont. Ab:      3


   Ectopics:      0





Pregnancy # 1


   Delivery date:     10/09/2016


   Weeks Gestation:   36


   Delivery type:     Vaginal


   Anesthesia type:     epidural


   Delivery location:     [facility]


   Infant Sex:      male


   Birth weight:      7.69


   Name:      Tre


   Comments:       labor





Pregnancy # 2


   Delivery date:     2018


   Weeks Gestation:   early


   Delivery type:     SAB








Past Medical History:


   Reviewed history from 2016 and no changes required:


      IDDM


      Asthma - albuterol


      Schitzoaffective dx - on lithium


      hx abnormal pap





Past Surgical History:


   appendectomy 


   right lobe of thyroid removed 


   elective masectomy 2018





Past Medical History 


Surgery (Non-gyn): appendectomy 2012


right lobe of thyroid removed 


elective masectomy 2018





Abnormal PAP: positive





Social Hx: Legally seperated


no ETOH/Drugs/smoking


FOC not involved 





Infection History 


Hx of STD: none


HIV Risk Eval: low risk


Hepatitis B Risk Eval: low risk


Rash, Viral, or Febrile illness since last LMP? no





Genetic History 


 Congenital Heart Defect:


    Mom: no  Dad: no


Canavan Disease:


    Mom: no  Dad: no


Thalassemia


    Mom: no  Dad: no


Neural Tube Defect


    Mom: no  Dad: no


Down's Syndrome


    Mom: no  Dad: no


Kiko-Sachs


    Mom: no  Dad: no


Sickle Cell Disease/Trait


    Mom: no  Dad: no


Hemophilia


    Mom: no  Dad: no


Muscular Dystrophy


    Mom: no  Dad: no


Cystic Fibrosis


    Mom: no  Dad: no


[place] Chorea


    Mom: no  Dad: no


Mental Retardation


    Mom: no  Dad: no


Fragile X


    Mom: no  Dad: no


Other Genetic/Chromosomal Disorder


    Mom: no  Dad: no


Child w/other birth defect


    Mom: no  Dad: no





Enviromental Exposures 


Xray Exposure: no


Medication, drug, or alcohol use since LMP: yes


Chemical/Other Exposure: no


Exposure to Cat Liter: no


Hx of Parvovirus (Fifth Disease): no


Occupational Exposure to Children: none


Active Medications (reviewed today):


INSULIN () 


PNV () 


PROZAC 20 MG ORAL CAPSULE (FLUOXETINE HCL) 1 tab daily


IB TABS () 


FIORICET -40 MG ORAL CAPSULE (BUTALBITAL-APAP-CAFFEINE) 


ADDERALL () 


HALDOL () 





Current Allergies:


* SULFA (Critical)


* SEAFOOD (Critical)


* MUSHROOMS (Critical)








Past History


Past Medical History: other (see HPI)


Past Surgical History: other (see HPI)


GYN History: other (see HPI)


Family/Genetic History: other (see HPI)





- Obstetrical History


Expected Date of Delivery: 19


Actual Gestation: 38 Week(s) 1 Day(s) 


: 5


Para: 1


Hx # Term Pregnancies: 0


Number of  Pregnancies: 1


Number of Living Children: 1





Medications and Allergies


                                    Allergies











Allergy/AdvReac Type Severity Reaction Status Date / Time


 


mushroom Allergy  Anaphylaxis Verified 10/08/16 22:52


 


Sulfa (Sulfonamide Allergy  Anaphylaxis Verified 10/08/16 22:52





Antibiotics)     


 


seafood Allergy  Itching Uncoded 10/08/16 22:53











                                Home Medications











 Medication  Instructions  Recorded  Confirmed  Last Taken  Type


 


Prenatal Vit-Fe Fumar-FA [Prenatal 1 tab PO QDAY 10/09/16 07/19/19 07/18/19 

History





Vitamin]     


 


NIFEdipine [Nifedipine] 20 mg PO BID #60 capsule 19 

22:00 Rx


 


Basaglar Kwikpen U-100 28 units SQ HS 19 History


 


Insulin Lispro [Humalog 100 14 - 22 units SQ QAC 19 

History





UNITS/ML Kwikpen]     


 


Sertraline [Zoloft] 50 mg PO QDAY 19 History











Active Meds: 


Active Medications





Dextrose (D50w (25gm) Syringe)  50 ml IV PRN PRN


   PRN Reason: Hypoglycemia


Ephedrine Sulfate (Ephedrine Sulfate)  10 mg IV Q2M PRN


   PRN Reason: Hypotension


Fentanyl (Sublimaze)  100 mcg IV Q2H PRN


   PRN Reason: Labor Pain


Oxytocin/Sodium Chloride (Pitocin/Ns 20 Unit/1000ml Drip)  20 units in 1,000 mls

 @ 125 mls/hr IV AS DIRECT KENIA


Oxytocin/Sodium Chloride (Pitocin/Ns 30 Unit/500ml)  30 units in 500 mls @ 4 

mls/hr IV TITR KENIA; Protocol


Lactated Ringer's (Lactated Ringers)  1,000 mls @ 125 mls/hr IV AS DIRECT KENIA


Insulin Human Regular (Humulin R)  0 units SUB-Q ACHS KENIA; Protocol


Lidocaine (Xylocaine 2%)  20 ml INFILTRATI ONCE ONE


   Stop: 19 08:24


Mineral Oil (Mineral Oil)  30 ml PO QHS PRN


   PRN Reason: Constipation


Ondansetron HCl (Zofran)  4 mg IV Q8H PRN


   PRN Reason: Nausea And Vomiting


Terbutaline Sulfate (Brethine)  0.25 mg SUB-Q ONCE PRN


   PRN Reason: Hyperstimulation/Hypertonicity











Review of Systems


All systems: negative





- Physical Exam


Cardiovascular: Regular rate


Lungs: Positive: Clear to auscultation, Normal air movement


Abdomen: Positive: normal appearance, soft


Genitourinary (Female): Positive: normal external genitalia, normal perenium


Vulva: both: normal


Vagina: Positive: normal moisture


Uterus: Positive: normal size, normal contour


Anus/Rectum: Positive: normal perianal skin


Extremities: Positive: normal


Deep Tendon Reflex Grade: Normal +2





- Obstetrical


FHR: category 1


Uterine Contraction Monitor Mode: External


Cervical Dilatation: 4 (cephalic)


Cervical Effacement Percentage: 80


Fetal station: -2


Uterine Contraction Pattern: Irregular


Uterine Tone Measurement Phase: Resting





Results


All other labs normal.








Assessment and Plan


 22y/o  @ 38+1 Weeks, IOL for IDDM. Admission orders in EMR. GBS NEG, EFW by

 [facility] 19 7#1oz.








- Patient Problems


(1) 38 weeks gestation of pregnancy


Current Visit: Yes   Status: Acute   





(2) Female-to-male transgender person


Current Visit: Yes   Status: Acute   





(3) IDDM (insulin dependent diabetes mellitus)


Current Visit: Yes   Status: Acute   


Plan to address problem: 


Delivery recommended by [facility] 


Accucheck q1hr in labor


Sliding scale insulin

## 2019-08-16 NOTE — ANESTHESIA CONSULTATION
Anesthesia Consult and Med Hx


Date of service: 08/16/19





- Airway


Anesthetic Teeth Evaluation: Good


ROM Head & Neck: Adequate


Mental/Hyoid Distance: Adequate


Mallampati Class: Class II


Intubation Access Assessment: Probably Good





- Pulmonary Exam


CTA: Yes





- Cardiac Exam


Cardiac Exam: RRR





- Pre-Operative Health Status


ASA Pre-Surgery Classification: ASA3


Proposed Anesthetic Plan: Epidural





- Pulmonary


Hx Asthma: Yes


COPD: No


Hx Pneumonia: No





- Cardiovascular System


Hx Hypertension: No





- Central Nervous System


Hx Seizures: No


Hx Psychiatric Problems: Yes (Bipolar depression)





- Endocrine


Hx Renal Disease: No


Hx End Stage Renal Disease: No


Hx Insulin Dependent Diabetes: Yes


Hx Hypothyroidism: Yes (tumor removed 2014; no longer on synthroid)


Hx Hyperthyroidism: No





- Hematic


Hx Anemia: No


Hx Sickle Cell Disease: No





- Other Systems


Hx Alcohol Use: No

## 2019-08-16 NOTE — EVENT NOTE
Date: 08/16/19


  Received dilshad from DYLAN Beck, states upon transfer to [facility], pt has fist size clot 

and increased lochia. Order given to continue pitocin and give methergine IM. 

Called back and received report from Pamela that fundus was firm and lochia scant 

after fundal massage and methergine. Will continue to [facility]. VSS per PAMELA.

## 2019-08-17 VITALS — DIASTOLIC BLOOD PRESSURE: 68 MMHG | SYSTOLIC BLOOD PRESSURE: 109 MMHG

## 2019-08-17 LAB
HCT VFR BLD CALC: 32.1 % (ref 30.3–42.9)
HGB BLD-MCNC: 10.6 GM/DL (ref 10.1–14.3)

## 2019-08-17 RX ADMIN — METHYLERGONOVINE MALEATE SCH MG: 0.2 TABLET ORAL at 13:56

## 2019-08-17 RX ADMIN — IBUPROFEN SCH MG: 600 TABLET, FILM COATED ORAL at 13:00

## 2019-08-17 RX ADMIN — METHYLERGONOVINE MALEATE SCH MG: 0.2 TABLET ORAL at 06:15

## 2019-08-17 RX ADMIN — DOCUSATE SODIUM SCH MG: 100 CAPSULE, LIQUID FILLED ORAL at 10:36

## 2019-08-17 RX ADMIN — IBUPROFEN SCH MG: 600 TABLET, FILM COATED ORAL at 06:15

## 2019-08-17 NOTE — DISCHARGE SUMMARY
Providers





- Providers


Date of Admission: 


19 08:10





Date of discharge: 19 (may discharge home 24 hrs post delivery if remains 

stable)


Attending physician: 


POLY OLIVAS





Primary care physician: 


POLY OLIVAS








Hospitalization


Reason for admission: IOL 


Condition: Good


Pertinent studies: 





post delivery H&H 10.6/32.1


Procedures: 








Hospital course: 





uncomplicated  and postpartum course


Disposition: DC-01 TO HOME OR SELFCARE





Core Measure Documentation





- Palliative Care


Palliative Care/ Comfort Measures: Not Applicable





- Core Measures


Any of the following diagnoses?: none





Exam





- Constitutional


Vitals: 


                                        











Temp Pulse Resp BP Pulse Ox


 


 98.5 F   86   18   119/83   97 


 


 19 07:44  19 07:44  19 07:44  19 07:44  19 07:44











General appearance: Present: no acute distress, well-nourished





- EENT


Eyes: Present: PERRL


ENT: hearing intact, clear oral mucosa





- Neck


Neck: Present: supple, normal ROM





- Respiratory


Respiratory effort: normal


Respiratory: bilateral: CTA





- Cardiovascular


Heart Sounds: Present: S1 & S2.  Absent: rub, click





- Extremities


Extremities: pulses symmetrical, No edema


Peripheral Pulses: within normal limits





- Abdominal


General gastrointestinal: Present: soft, non-tender, non-distended, normal bowel

sounds


Female genitourinary: Present: normal





- Integumentary


Integumentary: Present: clear, warm, dry





- Musculoskeletal


Musculoskeletal: gait normal, strength equal bilaterally





- Psychiatric


Psychiatric: appropriate mood/affect, intact judgment & insight





- Neurologic


Neurologic: CNII-XII intact, moves all extremities





- Additional findings


Additional findings: 


Patient resting in bed holding infant, attempting to feed formula from bottle. 

Pt reports feeling well, denies any complaints or concerns other than being 

tired. He reports having help with infant but was up frequently throughout the 

night with feeding infant. He reports occasional uterine cramping that ibuprofen

is working well for "most of the time:, suggested alternating tylenol with Ibu 

for continued pain relief. Fundus is firm, U/1, vaginal bleeding is small. Pt 

denies any heavy bleeding or clots since initial transfer to [facility] unit. He denies 

any dizziness or feeling faint with ambulation or position changed. DWP post 

delivery H&H and VS, stable. He desires discharge today. D/c order placed in EMR

for 24 hrs post delivery, may go home if he remains stable condition, RN aware 

to call with any abnormalities prior to D/c. RN confirms rhogam has been given. 

Documentation of rhogam confirmed on pt physical chart. 





Plan


Activity: no restrictions


Diet: regular


Follow up with: 


POLY OLIVAS MD [Primary Care Provider] - 19 (Congratulations! Please 

call [phone] to schedule your postpartum appointment in 4 weeks. Call with 

any questions or concerns. )